# Patient Record
Sex: FEMALE | URBAN - METROPOLITAN AREA
[De-identification: names, ages, dates, MRNs, and addresses within clinical notes are randomized per-mention and may not be internally consistent; named-entity substitution may affect disease eponyms.]

---

## 2018-12-17 ENCOUNTER — EMERGENCY (EMERGENCY)
Facility: HOSPITAL | Age: 23
LOS: 1 days | Discharge: HOME | End: 2018-12-17
Attending: EMERGENCY MEDICINE
Payer: SELF-PAY

## 2018-12-17 VITALS
HEIGHT: 60 IN | HEART RATE: 88 BPM | TEMPERATURE: 98 F | DIASTOLIC BLOOD PRESSURE: 88 MMHG | RESPIRATION RATE: 16 BRPM | WEIGHT: 125 LBS | OXYGEN SATURATION: 98 % | SYSTOLIC BLOOD PRESSURE: 123 MMHG

## 2018-12-17 PROCEDURE — T1013: CPT

## 2018-12-17 PROCEDURE — G0463: CPT

## 2018-12-17 NOTE — ED PROVIDER NOTE - OBJECTIVE STATEMENT
24 yo F Pt, presents to ED for suture removal. PT states she had sutures placed on thumb on 11/28/18 and needs "them removed." Pt denies redness, fever, chills, discharge, numbness, tingling, DROM, weakness, and any other acute symptoms at this time. UTD tetanus.

## 2018-12-17 NOTE — ED PROCEDURE NOTE - ATTENDING CONTRIBUTION TO CARE
I, Jessica Lei, independently evaluated the patient and discussed the procedure with the PA. I evaluated the patient prior to and after the procedure and the patient tolerated the procedure well. I discussed indications to return to the ED and the importance of proper follow up and patient verbalizes understanding.

## 2018-12-17 NOTE — ED PROVIDER NOTE - CHPI ED SYMPTOMS NEG
no redness/no pain/no red streaks/no fever/no inflammation/no bleeding/no chills/no drainage/no purulent drainage

## 2018-12-17 NOTE — ED PROVIDER NOTE - MUSCULOSKELETAL, MLM
Spine appears normal, range of motion is not limited, no muscle or joint tenderness. FROM of RUE 1st digit, no weakness, no ligamentous laxity

## 2018-12-17 NOTE — ED PROVIDER NOTE - PROGRESS NOTE DETAILS
sutures removed with out complications. PT verbalized understanding of diagnosis and importance of follow up at [facility]. PT educated on importance of follow up and when to return to the ED.

## 2018-12-17 NOTE — ED PROVIDER NOTE - ATTENDING CONTRIBUTION TO CARE
I, Jessica Lei, independently evaluated the patient. The PA made the initial evaluation and discussed history, physical and plan with me and I agree. Patient presents for suture removal on the right thumb placed 11/28. Laceration on right thumb appears well healed, no erythema or purulence. Sutures removed without incident. Patient to follow up with PMD, instructed indications to return to the ED and verbalized understanding.

## 2018-12-17 NOTE — ED PROVIDER NOTE - SKIN, MLM
Skin normal color for race, warm, dry and intact. No evidence of rash. + v shaped well healed scar with sutures in place, no erythema, no discharge, no swelling at site